# Patient Record
Sex: MALE | Race: WHITE | NOT HISPANIC OR LATINO | URBAN - METROPOLITAN AREA
[De-identification: names, ages, dates, MRNs, and addresses within clinical notes are randomized per-mention and may not be internally consistent; named-entity substitution may affect disease eponyms.]

---

## 2021-06-13 ENCOUNTER — EMERGENCY (EMERGENCY)
Facility: HOSPITAL | Age: 31
LOS: 1 days | Discharge: HOME | End: 2021-06-13
Admitting: EMERGENCY MEDICINE
Payer: OTHER MISCELLANEOUS

## 2021-06-13 VITALS
TEMPERATURE: 98 F | HEART RATE: 99 BPM | RESPIRATION RATE: 18 BRPM | SYSTOLIC BLOOD PRESSURE: 111 MMHG | OXYGEN SATURATION: 100 % | DIASTOLIC BLOOD PRESSURE: 74 MMHG
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
## 2021-06-13 DIAGNOSIS — S05.01XA INJURY OF CONJUNCTIVA AND CORNEAL ABRASION WITHOUT FOREIGN BODY, RIGHT EYE, INITIAL ENCOUNTER: ICD-10-CM

## 2021-06-13 PROCEDURE — 99283 EMERGENCY DEPT VISIT LOW MDM: CPT | Mod: 25

## 2021-06-13 PROCEDURE — 65205 REMOVE FOREIGN BODY FROM EYE: CPT

## 2021-06-13 PROCEDURE — 99053 MED SERV 10PM-8AM 24 HR FAC: CPT

## 2021-06-13 NOTE — ED ADULT TRIAGE NOTE - OTHER COMPLAINTS
CC of eye problem R, feels that there is a particle, "feels weird" as per him but mentioned that a glass broke while he is in the bar. admitted to alcohol but non violent

## 2021-06-14 PROCEDURE — 65220 REMOVE FOREIGN BODY FROM EYE: CPT | Mod: RT

## 2021-06-14 PROCEDURE — 99283 EMERGENCY DEPT VISIT LOW MDM: CPT | Mod: 25

## 2021-06-14 RX ORDER — OFLOXACIN 0.3 %
1 DROPS OPHTHALMIC (EYE) ONCE
Refills: 0 | Status: COMPLETED | OUTPATIENT
Start: 2021-06-14 | End: 2021-06-14

## 2021-06-14 RX ORDER — FLUORESCEIN SODIUM 9 MG
1 STRIP OPHTHALMIC (EYE) ONCE
Refills: 0 | Status: COMPLETED | OUTPATIENT
Start: 2021-06-14 | End: 2021-06-14

## 2021-06-14 RX ADMIN — Medication 1 DROP(S): at 01:02

## 2021-06-14 RX ADMIN — Medication 1 DROP(S): at 00:39

## 2021-06-14 RX ADMIN — Medication 1 APPLICATION(S): at 00:39

## 2021-06-14 NOTE — ED PROVIDER NOTE - OBJECTIVE STATEMENT
30 healthy M p/w R eye pain and FB sensation after injury. Pt reports a glass broke while he was at bar and thinks maybe a piece went into his eye.  + tearing, blurred vision, eye pain and redness.  Denies contact lenses, double vision, flashes light, floaters, headache, dizziness, fainting, use of AC.  tetanus UTD

## 2021-06-14 NOTE — ED ADULT NURSE NOTE - CHPI ED NUR SYMPTOMS NEG
no discharge/no double vision/no drainage/no eye lid swelling/no foreign body/no itching/no pain/no purulent drainage

## 2021-06-14 NOTE — ED PROVIDER NOTE - CLINICAL SUMMARY MEDICAL DECISION MAKING FREE TEXT BOX
30 healthy M p/w R eye pain and FB sensation after injury.   on exam HEENT: ncat, eomi, perrla, injected R sclera w/ + tearing, + pinpoint FB noted under upper inner R eyelid, + uptake fluorscene R corneal 9oclock position, ernie sign neg, no FB noted over cornea, VA 20/20 R, 20/13 L, mmm  FB removed w/ Qtip and relief of FB sensation.  exam consistent with corneal abrasion, will dc with abx drops and outpt ophtho referral.  discussed strict return parameters

## 2021-06-14 NOTE — ED PROVIDER NOTE - PATIENT PORTAL LINK FT
You can access the FollowMyHealth Patient Portal offered by [facility] by registering at the following website: [email or web address]. By joining [facility]’s FollowMyHealth portal, you will also be able to view your health information using other applications (apps) compatible with our system.

## 2021-06-14 NOTE — ED ADULT NURSE NOTE - OBJECTIVE STATEMENT
pt is 30y male, here for a RT eye irritation, pt was doing dishes and was splashed with water, since then reports inability to keep RT open, denies any pain, RT eye noted red but no swelling or discharge, NAD present

## 2021-06-14 NOTE — ED PROVIDER NOTE - NSFOLLOWUPINSTRUCTIONS_ED_ALL_ED_FT
Instill 2 drops in right eye every 6 hours.  Call to make appointment with eye doctor within 2-3 days      Corneal Abrasion     WHAT YOU NEED TO KNOW:    A corneal abrasion is a scratch on the cornea of your eye. The cornea is the clear layer that covers the front of your eye. A small scratch may heal in 1 to 2 days. Deeper or larger scratches may take longer to heal.     Eye Anatomy         DISCHARGE INSTRUCTIONS:    Call your healthcare provider or ophthalmologist if:   •Your eye pain or vision gets worse.       •You have yellow or green drainage from your eye.       •You have questions or concerns about your condition or care.       Medicines:   •Medicines may be given in the form of eyedrops or ointment to help prevent an eye infection. You may also be given eyedrops to decrease pain.      •Take your medicine as directed. Contact your healthcare provider if you think your medicine is not helping or if you have side effects. Tell him or her if you are allergic to any medicine. Keep a list of the medicines, vitamins, and herbs you take. Include the amounts, and when and why you take them. Bring the list or the pill bottles to follow-up visits. Carry your medicine list with you in case of an emergency.      Care for your eyes:   •Get regular eye exams. Get your eyes checked at least every year.      •Eat healthy foods. Fresh fruits and vegetables that are rich in vitamins A and C may help with your vision. Foods such as sweet potatoes, apricots, and carrots are rich in good nutrients for the eyes.      •Take care of your contacts or glasses. Store, clean, and use your contacts or glasses as directed. Replace your glasses or contact lenses as often as your healthcare provider suggests.      •Decrease eye strain. Rest your eyes, especially after you read or sew for long periods of time. Get plenty of sleep at night. Use lights that reduce glare in your home, school, or workplace.      •Wear dark sunglasses. This will help prevent pain and light sensitivity. Make sure the sunglasses have UVA and UVB protection. This will protect your eyes when you go outside.      •Use eyedrops safely. If your treatment plan includes eyedrops, it is important to use them as directed. Your provider may give you detailed instructions to follow. The eyedrops may also come with safety instructions. Follow all instructions to help prevent an infection. Do not touch the tip of the bottle to your eye. Germs from your eye can spread to the medicine bottle.  Steps 1 2 3 4           Help prevent corneal abrasions:   •Remove your contact lenses if your eyes feel dry or irritated.      •Wash your hands if you need to touch your eyes or your face.      •Trim your child's fingernails so he or she cannot scratch his or her eye.      •Wear protective eyewear when you work with chemicals, wood, dust, or metal.      •Wear protective eyewear when you play sports.      •Do not wear your contacts for longer than you should.      •Do not wear colored lenses or lenses with shapes on them. These lenses may cause eye damage and vision loss.      •Do not wear glitter makeup. Glitter can easily get into your eyes and under contact lenses.      •Do not sleep with your contacts in.      Follow up with your healthcare provider as directed: Write down your questions so you remember to ask them during your visits.

## 2021-06-14 NOTE — ED PROVIDER NOTE - NSFOLLOWUPCLINICS_GEN_ALL_ED_FT
[facility] - Ophthalmology Clinic  Ophthalmology  [address], [address]st [place] [address]  Phone: [phone]  Fax:     [facility]  Ophthalmology  [address] [place] [id number]  Phone: [phone]  Fax:

## 2021-06-14 NOTE — ED PROVIDER NOTE - PHYSICAL EXAMINATION
Vitals reviewed  Gen: well appearing, nad, speaking in full sentences  Skin: wwp,  HEENT: ncat, eomi, perrla, injected R sclera w/ + tearing, + pinpoint FB noted under upper inner R eyelid, + uptake fluorscene R corneal 9oclock position, ernie sign neg, no FB noted over cornea, VA 20/20 R, 20/13 L, mmm  CV: rrr, no audible m/r/g  Resp: unlabored breathing  Ext: FROM throughout, no peripheral edema  Neuro: alert/oriented, no focal deficits, steady gait

## 2022-06-04 ENCOUNTER — INPATIENT (INPATIENT)
Facility: HOSPITAL | Age: 32
LOS: 1 days | Discharge: HOME | DRG: 370 | End: 2022-06-06
Attending: STUDENT IN AN ORGANIZED HEALTH CARE EDUCATION/TRAINING PROGRAM | Admitting: STUDENT IN AN ORGANIZED HEALTH CARE EDUCATION/TRAINING PROGRAM
Payer: COMMERCIAL

## 2022-06-04 VITALS
TEMPERATURE: 98 F | DIASTOLIC BLOOD PRESSURE: 83 MMHG | SYSTOLIC BLOOD PRESSURE: 133 MMHG | OXYGEN SATURATION: 96 % | HEIGHT: 67 IN | WEIGHT: 190.04 LBS | RESPIRATION RATE: 16 BRPM | HEART RATE: 89 BPM

## 2022-06-04 DIAGNOSIS — Z29.9 ENCOUNTER FOR PROPHYLACTIC MEASURES, UNSPECIFIED: ICD-10-CM

## 2022-06-04 DIAGNOSIS — F10.10 ALCOHOL ABUSE, UNCOMPLICATED: ICD-10-CM

## 2022-06-04 DIAGNOSIS — K22.6 GASTRO-ESOPHAGEAL LACERATION-HEMORRHAGE SYNDROME: ICD-10-CM

## 2022-06-04 DIAGNOSIS — K92.0 HEMATEMESIS: ICD-10-CM

## 2022-06-04 LAB
ALBUMIN SERPL ELPH-MCNC: 4.3 G/DL — SIGNIFICANT CHANGE UP (ref 3.3–5)
ALBUMIN SERPL ELPH-MCNC: 4.8 G/DL — SIGNIFICANT CHANGE UP (ref 3.3–5)
ALP SERPL-CCNC: 71 U/L — SIGNIFICANT CHANGE UP (ref 40–120)
ALP SERPL-CCNC: 79 U/L — SIGNIFICANT CHANGE UP (ref 40–120)
ALT FLD-CCNC: 144 U/L — HIGH (ref 10–45)
ALT FLD-CCNC: 177 U/L — HIGH (ref 10–45)
ANION GAP SERPL CALC-SCNC: 16 MMOL/L — SIGNIFICANT CHANGE UP (ref 5–17)
APPEARANCE UR: CLEAR — SIGNIFICANT CHANGE UP
APTT BLD: 33.5 SEC — SIGNIFICANT CHANGE UP (ref 27.5–35.5)
AST SERPL-CCNC: 147 U/L — HIGH (ref 10–40)
AST SERPL-CCNC: 184 U/L — HIGH (ref 10–40)
BACTERIA # UR AUTO: PRESENT /HPF
BASE EXCESS BLDV CALC-SCNC: 3.5 MMOL/L — HIGH (ref -2–3)
BASOPHILS # BLD AUTO: 0.06 K/UL — SIGNIFICANT CHANGE UP (ref 0–0.2)
BASOPHILS NFR BLD AUTO: 1.3 % — SIGNIFICANT CHANGE UP (ref 0–2)
BILIRUB DIRECT SERPL-MCNC: 0.4 MG/DL — HIGH (ref 0–0.3)
BILIRUB INDIRECT FLD-MCNC: 0.8 MG/DL — SIGNIFICANT CHANGE UP (ref 0.2–1)
BILIRUB SERPL-MCNC: 1 MG/DL — SIGNIFICANT CHANGE UP (ref 0.2–1.2)
BILIRUB SERPL-MCNC: 1.2 MG/DL — SIGNIFICANT CHANGE UP (ref 0.2–1.2)
BILIRUB UR-MCNC: ABNORMAL
BLD GP AB SCN SERPL QL: NEGATIVE — SIGNIFICANT CHANGE UP
BUN SERPL-MCNC: 7 MG/DL — SIGNIFICANT CHANGE UP (ref 7–23)
CALCIUM SERPL-MCNC: 9.1 MG/DL — SIGNIFICANT CHANGE UP (ref 8.4–10.5)
CHLORIDE SERPL-SCNC: 101 MMOL/L — SIGNIFICANT CHANGE UP (ref 96–108)
CO2 BLDV-SCNC: 30.5 MMOL/L — HIGH (ref 22–26)
CO2 SERPL-SCNC: 25 MMOL/L — SIGNIFICANT CHANGE UP (ref 22–31)
COLOR SPEC: YELLOW — SIGNIFICANT CHANGE UP
COMMENT - URINE: SIGNIFICANT CHANGE UP
CREAT SERPL-MCNC: 0.78 MG/DL — SIGNIFICANT CHANGE UP (ref 0.5–1.3)
DIFF PNL FLD: NEGATIVE — SIGNIFICANT CHANGE UP
EGFR: 121 ML/MIN/1.73M2 — SIGNIFICANT CHANGE UP
EOSINOPHIL # BLD AUTO: 0.07 K/UL — SIGNIFICANT CHANGE UP (ref 0–0.5)
EOSINOPHIL NFR BLD AUTO: 1.5 % — SIGNIFICANT CHANGE UP (ref 0–6)
EPI CELLS # UR: SIGNIFICANT CHANGE UP /HPF (ref 0–5)
ETHANOL SERPL-MCNC: 36 MG/DL — HIGH (ref 0–10)
GLUCOSE SERPL-MCNC: 101 MG/DL — HIGH (ref 70–99)
GLUCOSE UR QL: 100
HCO3 BLDV-SCNC: 29 MMOL/L — SIGNIFICANT CHANGE UP (ref 22–29)
HCT VFR BLD CALC: 41.2 % — SIGNIFICANT CHANGE UP (ref 39–50)
HGB BLD-MCNC: 14.3 G/DL — SIGNIFICANT CHANGE UP (ref 13–17)
IMM GRANULOCYTES NFR BLD AUTO: 0.2 % — SIGNIFICANT CHANGE UP (ref 0–1.5)
INR BLD: 1.08 — SIGNIFICANT CHANGE UP (ref 0.88–1.16)
KETONES UR-MCNC: 15 MG/DL
LEUKOCYTE ESTERASE UR-ACNC: NEGATIVE — SIGNIFICANT CHANGE UP
LYMPHOCYTES # BLD AUTO: 1.04 K/UL — SIGNIFICANT CHANGE UP (ref 1–3.3)
LYMPHOCYTES # BLD AUTO: 22.2 % — SIGNIFICANT CHANGE UP (ref 13–44)
MCHC RBC-ENTMCNC: 32.1 PG — SIGNIFICANT CHANGE UP (ref 27–34)
MCHC RBC-ENTMCNC: 34.7 GM/DL — SIGNIFICANT CHANGE UP (ref 32–36)
MCV RBC AUTO: 92.4 FL — SIGNIFICANT CHANGE UP (ref 80–100)
MONOCYTES # BLD AUTO: 0.44 K/UL — SIGNIFICANT CHANGE UP (ref 0–0.9)
MONOCYTES NFR BLD AUTO: 9.4 % — SIGNIFICANT CHANGE UP (ref 2–14)
NEUTROPHILS # BLD AUTO: 3.07 K/UL — SIGNIFICANT CHANGE UP (ref 1.8–7.4)
NEUTROPHILS NFR BLD AUTO: 65.4 % — SIGNIFICANT CHANGE UP (ref 43–77)
NITRITE UR-MCNC: NEGATIVE — SIGNIFICANT CHANGE UP
NRBC # BLD: 0 /100 WBCS — SIGNIFICANT CHANGE UP (ref 0–0)
PCO2 BLDV: 47 MMHG — SIGNIFICANT CHANGE UP (ref 42–55)
PCP SPEC-MCNC: SIGNIFICANT CHANGE UP
PH BLDV: 7.4 — SIGNIFICANT CHANGE UP (ref 7.32–7.43)
PH UR: 8 — SIGNIFICANT CHANGE UP (ref 5–8)
PLATELET # BLD AUTO: 190 K/UL — SIGNIFICANT CHANGE UP (ref 150–400)
PO2 BLDV: 60 MMHG — HIGH (ref 25–45)
POTASSIUM SERPL-MCNC: 3.9 MMOL/L — SIGNIFICANT CHANGE UP (ref 3.5–5.3)
POTASSIUM SERPL-SCNC: 3.9 MMOL/L — SIGNIFICANT CHANGE UP (ref 3.5–5.3)
PROT SERPL-MCNC: 7.3 G/DL — SIGNIFICANT CHANGE UP (ref 6–8.3)
PROT SERPL-MCNC: 7.9 G/DL — SIGNIFICANT CHANGE UP (ref 6–8.3)
PROT UR-MCNC: 30 MG/DL
PROTHROM AB SERPL-ACNC: 12.9 SEC — SIGNIFICANT CHANGE UP (ref 10.5–13.4)
RBC # BLD: 4.46 M/UL — SIGNIFICANT CHANGE UP (ref 4.2–5.8)
RBC # FLD: 11.9 % — SIGNIFICANT CHANGE UP (ref 10.3–14.5)
RBC CASTS # UR COMP ASSIST: < 5 /HPF — SIGNIFICANT CHANGE UP
RH IG SCN BLD-IMP: POSITIVE — SIGNIFICANT CHANGE UP
SAO2 % BLDV: 88.6 % — HIGH (ref 67–88)
SARS-COV-2 RNA SPEC QL NAA+PROBE: NEGATIVE — SIGNIFICANT CHANGE UP
SODIUM SERPL-SCNC: 142 MMOL/L — SIGNIFICANT CHANGE UP (ref 135–145)
SP GR SPEC: 1.01 — SIGNIFICANT CHANGE UP (ref 1–1.03)
UROBILINOGEN FLD QL: 4 E.U./DL
WBC # BLD: 4.69 K/UL — SIGNIFICANT CHANGE UP (ref 3.8–10.5)
WBC # FLD AUTO: 4.69 K/UL — SIGNIFICANT CHANGE UP (ref 3.8–10.5)
WBC UR QL: < 5 /HPF — SIGNIFICANT CHANGE UP

## 2022-06-04 PROCEDURE — 99223 1ST HOSP IP/OBS HIGH 75: CPT

## 2022-06-04 PROCEDURE — 99285 EMERGENCY DEPT VISIT HI MDM: CPT

## 2022-06-04 PROCEDURE — 93010 ELECTROCARDIOGRAM REPORT: CPT

## 2022-06-04 PROCEDURE — 71045 X-RAY EXAM CHEST 1 VIEW: CPT | Mod: 26

## 2022-06-04 RX ORDER — PANTOPRAZOLE SODIUM 20 MG/1
80 TABLET, DELAYED RELEASE ORAL ONCE
Refills: 0 | Status: COMPLETED | OUTPATIENT
Start: 2022-06-04 | End: 2022-06-04

## 2022-06-04 RX ORDER — SODIUM CHLORIDE 9 MG/ML
1000 INJECTION INTRAMUSCULAR; INTRAVENOUS; SUBCUTANEOUS ONCE
Refills: 0 | Status: COMPLETED | OUTPATIENT
Start: 2022-06-04 | End: 2022-06-04

## 2022-06-04 RX ORDER — ONDANSETRON 8 MG/1
4 TABLET, FILM COATED ORAL EVERY 6 HOURS
Refills: 0 | Status: DISCONTINUED | OUTPATIENT
Start: 2022-06-04 | End: 2022-06-05

## 2022-06-04 RX ORDER — ONDANSETRON 8 MG/1
4 TABLET, FILM COATED ORAL ONCE
Refills: 0 | Status: COMPLETED | OUTPATIENT
Start: 2022-06-04 | End: 2022-06-04

## 2022-06-04 RX ORDER — PANTOPRAZOLE SODIUM 20 MG/1
8 TABLET, DELAYED RELEASE ORAL
Qty: 80 | Refills: 0 | Status: DISCONTINUED | OUTPATIENT
Start: 2022-06-04 | End: 2022-06-05

## 2022-06-04 RX ADMIN — PANTOPRAZOLE SODIUM 80 MILLIGRAM(S): 20 TABLET, DELAYED RELEASE ORAL at 17:09

## 2022-06-04 RX ADMIN — ONDANSETRON 4 MILLIGRAM(S): 8 TABLET, FILM COATED ORAL at 18:14

## 2022-06-04 RX ADMIN — SODIUM CHLORIDE 1000 MILLILITER(S): 9 INJECTION INTRAMUSCULAR; INTRAVENOUS; SUBCUTANEOUS at 17:10

## 2022-06-04 RX ADMIN — ONDANSETRON 4 MILLIGRAM(S): 8 TABLET, FILM COATED ORAL at 17:09

## 2022-06-04 RX ADMIN — PANTOPRAZOLE SODIUM 10 MG/HR: 20 TABLET, DELAYED RELEASE ORAL at 17:35

## 2022-06-04 RX ADMIN — ONDANSETRON 4 MILLIGRAM(S): 8 TABLET, FILM COATED ORAL at 21:48

## 2022-06-04 RX ADMIN — Medication 1 MILLIGRAM(S): at 17:09

## 2022-06-04 NOTE — H&P ADULT - HISTORY OF PRESENT ILLNESS
Patient is a 31 year old male with no significant past medical history presenting with 1 day history of hematemesis     ED course:  Vitals: 97.7F, HR 88, /63 (orthostatics negative)  Labs: WBC 4, Hb 14.3/Hct 41.2, plt 190, INR 1.08, BUN 7/Cr 0.78, /, Alcohol 36  Imaging: CXR WNL  Interventions: ativan x1, zofran x2, PPI 80 --> PPI gtt, 1L NS x1, GI consulted Patient is a 31 year old male with no significant past medical history presenting with 1 day history of hematemesis. Pt reports drinking 1/2 bottle of whiskey last night and then had 6 episodes of blood emesis today associated with epigastric tenderness. Drinks 5x/week and varies between 4 beers/day or 2-3 shots of whiskey. No history of alcohol withdrawal or seizures. No fever/chills/SOB/chest pain/dizziness.     ED course:  Vitals: 97.7F, HR 88, /63 (orthostatics negative)  Labs: WBC 4, Hb 14.3/Hct 41.2, plt 190, INR 1.08, BUN 7/Cr 0.78, /, Alcohol 36  Imaging: CXR WNL  Interventions: ativan x1, zofran x2, PPI 80 --> PPI gtt, 1L NS x1, GI consulted Patient is a 31 year old male with no significant past medical history presenting with 1 day history of hematemesis. Pt reports drinking 1/2 bottle of whiskey last night and then had 6 episodes of blood emesis (estimated to be around 8oz each) today associated with epigastric tenderness. Initially emesis bilious until 3AM this morning when patient notes dark coloration to vomit. Drinks daily 2-3L daily (beer, rum, champagne). 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 Patient notes to have experienced darker stools than usual (2 weeks in duration) without diarrhea or change in caliber. Notably patient denies ever experiencing alcohol withdrawal, has never experienced DTs, has not needed to be intubated. Denies tobacco use, used cocaine once a month ago, last IVUD used 1 month ago. Patient father with bowel + lung + throat CA, etiology of initial cancer unclear.     ED course:  Vitals: 97.7F, HR 88, /63 (orthostatics negative)  Labs: WBC 4, Hb 14.3/Hct 41.2, plt 190, INR 1.08, BUN 7/Cr 0.78, /, Alcohol 36  Imaging: CXR WNL  Interventions: ativan x1, zofran x2, PPI 80 --> PPI gtt, 1L NS x1, GI consulted

## 2022-06-04 NOTE — H&P ADULT - PROBLEM SELECTOR PLAN 2
RULE OUT: p/w 6 episodes coffee ground emesis possibly 2/2 mallorry hernández  - see management above RULE OUT: p/w 6 episodes coffee ground emesis possibly 2/2 mallorry hernández  - see management above  - zofran 4mg IVP q6h

## 2022-06-04 NOTE — ED ADULT NURSE NOTE - OBJECTIVE STATEMENT
Pt complains of "vomiting blood" since 1 hour ago. pt also reports constant epigastric/burning pain since multiple days ago. Non-radiating. Pain worst when eating. Pt reports ETOH use, half a bottle of whiskey X2 weekly.  last use last night. Pt denies blood in the stool.

## 2022-06-04 NOTE — H&P ADULT - NSHPPHYSICALEXAM_GEN_ALL_CORE
T(C): 36.5 (06-04-22 @ 17:56), Max: 36.8 (06-04-22 @ 16:21)  HR: 88 (06-04-22 @ 17:35) (88 - 89)  BP: 131/63 (06-04-22 @ 17:35) (131/63 - 133/83)  RR: 18 (06-04-22 @ 17:56) (16 - 18)  SpO2: 96% (06-04-22 @ 17:56) (96% - 97%)    General: NAD, laying in bed, speaking in full sentences  HEENT: head NC/AT, no conjunctival injection, EOMI, MMM  Neck: supple, no JVD  Cardio: RRR, +S1/S2, no M/R/G  Resp: lungs CTAB, no cough/wheezes/rales/rhonchi  Abdo: soft, NT, ND, +bowel sounds x4  Extremities: WWP, no edema/cyanosis/clubbing   Vasc: 2+ radial and DP pulses b/l  Neuro: A&Ox3  Psych: speech non-pressured, thoughts goal-oriented  Skin: dry, intact, no visible jaundice T(C): 36.5 (06-04-22 @ 17:56), Max: 36.8 (06-04-22 @ 16:21)  HR: 88 (06-04-22 @ 17:35) (88 - 89)  BP: 131/63 (06-04-22 @ 17:35) (131/63 - 133/83)  RR: 18 (06-04-22 @ 17:56) (16 - 18)  SpO2: 96% (06-04-22 @ 17:56) (96% - 97%)    General: NAD, laying in bed, speaking in full sentences  HEENT: head NC/AT, no conjunctival injection, EOMI, MMM  Neck: supple, no JVD  Cardio: RRR, +S1/S2, no M/R/G  Resp: lungs CTAB, no cough/wheezes/rales/rhonchi  Abdo: soft, epigatric tenderness, ND, +bowel sounds x4  Extremities: WWP, no edema/cyanosis/clubbing   Vasc: 2+ radial and DP pulses b/l  Neuro: A&Ox3  Psych: speech non-pressured, thoughts goal-oriented  Skin: dry, intact, no visible jaundice

## 2022-06-04 NOTE — H&P ADULT - NSHPLABSRESULTS_GEN_ALL_CORE
.  LABS:                         14.3   4.69  )-----------( 190      ( 04 Jun 2022 17:02 )             41.2     06-04    142  |  101  |  7   ----------------------------<  101<H>  3.9   |  25  |  0.78    Ca    9.1      04 Jun 2022 17:02    TPro  7.9  /  Alb  4.8  /  TBili  1.0  /  DBili  x   /  AST  184<H>  /  ALT  177<H>  /  AlkPhos  79  06-04    PT/INR - ( 04 Jun 2022 17:02 )   PT: 12.9 sec;   INR: 1.08          PTT - ( 04 Jun 2022 17:02 )  PTT:33.5 sec              RADIOLOGY, EKG & ADDITIONAL TESTS: Reviewed.

## 2022-06-04 NOTE — ED ADULT NURSE NOTE - CHIEF COMPLAINT QUOTE
vomiting blood.   pt states vomited bright red blood x 5-6 times today.  States has had epigastric pain x 3 days.   States drinks large quantities of alcohol daily.

## 2022-06-04 NOTE — H&P ADULT - PROBLEM SELECTOR PLAN 1
p/w 1 day hx of hematemesis after binge drinking. Reports 6 episodes of whats described as coffee ground emesis.   - GI consulted, recs appreciated  - c/w PPI gtt  - NPO  - active T&S, transfuse Hb <7 p/w 1 day hx of hematemesis after binge drinking. Reports 6 episodes of whats described as coffee ground emesis.   - GI consulted, recs appreciated  - c/w PPI gtt  - 2 large bore perpheral IVs  - NPO  - active T&S, transfuse Hb <7

## 2022-06-04 NOTE — ED ADULT TRIAGE NOTE - CHIEF COMPLAINT QUOTE
vomiting blood.   pt states vomited bright red blood x 4-5 times today.  States has had epigastric pain x 3 days.   States drinks large quantities of alcohol daily. vomiting blood.   pt states vomited bright red blood x 5-6 times today.  States has had epigastric pain x 3 days.   States drinks large quantities of alcohol daily.

## 2022-06-04 NOTE — ED PROVIDER NOTE - CLINICAL SUMMARY MEDICAL DECISION MAKING FREE TEXT BOX
n/v - coffee ground emesis- suspect GI bleeding related to excess etoh- iv ppi bolus/gtt- manage w/d sx, gi consult n/v - coffee ground emesis- suspect GI bleeding related to excess etoh- iv ppi bolus/gtt- manage w/d sx, gi consult  d/w GI agree w probable avel hernández- rec control n/v/ w/d sx- ppi npo- they will follow

## 2022-06-04 NOTE — H&P ADULT - PROBLEM SELECTOR PLAN 3
Reports drinking multiple times/week. Reports drinking 5 times/week, either 4 beers or 2-3 shots whiskey. no hx of alcohol withdrawal or seizures. previous Iv drug use, last use 1 month ago.  - f/u U tox  - CIWA currently 0   - CIWA protocol

## 2022-06-04 NOTE — H&P ADULT - NSHPREVIEWOFSYSTEMS_GEN_ALL_CORE
REVIEW OF SYSTEMS:    CONSTITUTIONAL: No weakness, fevers or chills  EYES/ENT: No visual changes;  No throat pain   NECK: No pain or stiffness  RESPIRATORY: No cough, wheezing, hemoptysis; No shortness of breath  CARDIOVASCULAR: No chest pain or palpitations  GASTROINTESTINAL: No abdominal. No diarrhea or constipation. No melena. (+) vomiting, hematemesis   GENITOURINARY: No dysuria, frequency or hematuria  NEUROLOGICAL: No numbness or weakness  SKIN: No itching, rashes

## 2022-06-04 NOTE — H&P ADULT - PROBLEM SELECTOR PLAN 4
F: s/p 1L NS  E: replete to K>4, Mg>2, Phos>2.5  N: NPO   Ppx: PPI gtt    Code Status: full code    Dispo: [facility]

## 2022-06-04 NOTE — H&P ADULT - ASSESSMENT
Patient is a 31 year old male with no significant past medical history presenting with 1 day history of hematemesis admitted for likely avel hernández  Patient is a 31 year old male with no significant past medical history presenting with 1 day history of hematemesis admitted for likely avel hernández tear, known to GI. No prior hx of alcohol withdrawal or intubation.

## 2022-06-04 NOTE — ED ADULT NURSE NOTE - NSIMPLEMENTINTERV_GEN_ALL_ED
Implemented All Universal Safety Interventions:  [place] to call system. Call bell, personal items and telephone within reach. Instruct patient to call for assistance. Room bathroom lighting operational. Non-slip footwear when patient is off stretcher. Physically safe environment: no spills, clutter or unnecessary equipment. Stretcher in lowest position, wheels locked, appropriate side rails in place.

## 2022-06-04 NOTE — H&P ADULT - NSHPSOCIALHISTORY_GEN_ALL_CORE
Patient reports to drink ________, smokes ________ packs per day, Patient drinks 5/x week either ~4 beers or 2-3 shots of whiskey, non smoker, previous IVUD last used 1 month ago

## 2022-06-05 LAB
ALBUMIN SERPL ELPH-MCNC: 4.2 G/DL — SIGNIFICANT CHANGE UP (ref 3.3–5)
ALP SERPL-CCNC: 69 U/L — SIGNIFICANT CHANGE UP (ref 40–120)
ALT FLD-CCNC: 134 U/L — HIGH (ref 10–45)
AMPHET UR-MCNC: NEGATIVE — SIGNIFICANT CHANGE UP
ANION GAP SERPL CALC-SCNC: 15 MMOL/L — SIGNIFICANT CHANGE UP (ref 5–17)
APTT BLD: 30.6 SEC — SIGNIFICANT CHANGE UP (ref 27.5–35.5)
AST SERPL-CCNC: 122 U/L — HIGH (ref 10–40)
BARBITURATES UR SCN-MCNC: NEGATIVE — SIGNIFICANT CHANGE UP
BASOPHILS # BLD AUTO: 0.05 K/UL — SIGNIFICANT CHANGE UP (ref 0–0.2)
BASOPHILS NFR BLD AUTO: 1.1 % — SIGNIFICANT CHANGE UP (ref 0–2)
BENZODIAZ UR-MCNC: NEGATIVE — SIGNIFICANT CHANGE UP
BILIRUB SERPL-MCNC: 1.4 MG/DL — HIGH (ref 0.2–1.2)
BLD GP AB SCN SERPL QL: NEGATIVE — SIGNIFICANT CHANGE UP
BUN SERPL-MCNC: 12 MG/DL — SIGNIFICANT CHANGE UP (ref 7–23)
CALCIUM SERPL-MCNC: 8.7 MG/DL — SIGNIFICANT CHANGE UP (ref 8.4–10.5)
CHLORIDE SERPL-SCNC: 102 MMOL/L — SIGNIFICANT CHANGE UP (ref 96–108)
CO2 SERPL-SCNC: 23 MMOL/L — SIGNIFICANT CHANGE UP (ref 22–31)
COCAINE METAB.OTHER UR-MCNC: POSITIVE
CREAT SERPL-MCNC: 0.85 MG/DL — SIGNIFICANT CHANGE UP (ref 0.5–1.3)
EGFR: 118 ML/MIN/1.73M2 — SIGNIFICANT CHANGE UP
EOSINOPHIL # BLD AUTO: 0.16 K/UL — SIGNIFICANT CHANGE UP (ref 0–0.5)
EOSINOPHIL NFR BLD AUTO: 3.5 % — SIGNIFICANT CHANGE UP (ref 0–6)
GLUCOSE SERPL-MCNC: 70 MG/DL — SIGNIFICANT CHANGE UP (ref 70–99)
HCT VFR BLD CALC: 39.7 % — SIGNIFICANT CHANGE UP (ref 39–50)
HGB BLD-MCNC: 13.3 G/DL — SIGNIFICANT CHANGE UP (ref 13–17)
IMM GRANULOCYTES NFR BLD AUTO: 0.4 % — SIGNIFICANT CHANGE UP (ref 0–1.5)
LYMPHOCYTES # BLD AUTO: 1.2 K/UL — SIGNIFICANT CHANGE UP (ref 1–3.3)
LYMPHOCYTES # BLD AUTO: 26.5 % — SIGNIFICANT CHANGE UP (ref 13–44)
MAGNESIUM SERPL-MCNC: 2.3 MG/DL — SIGNIFICANT CHANGE UP (ref 1.6–2.6)
MCHC RBC-ENTMCNC: 31.7 PG — SIGNIFICANT CHANGE UP (ref 27–34)
MCHC RBC-ENTMCNC: 33.5 GM/DL — SIGNIFICANT CHANGE UP (ref 32–36)
MCV RBC AUTO: 94.7 FL — SIGNIFICANT CHANGE UP (ref 80–100)
METHADONE UR-MCNC: NEGATIVE — SIGNIFICANT CHANGE UP
MONOCYTES # BLD AUTO: 0.51 K/UL — SIGNIFICANT CHANGE UP (ref 0–0.9)
MONOCYTES NFR BLD AUTO: 11.3 % — SIGNIFICANT CHANGE UP (ref 2–14)
NEUTROPHILS # BLD AUTO: 2.58 K/UL — SIGNIFICANT CHANGE UP (ref 1.8–7.4)
NEUTROPHILS NFR BLD AUTO: 57.2 % — SIGNIFICANT CHANGE UP (ref 43–77)
NRBC # BLD: 0 /100 WBCS — SIGNIFICANT CHANGE UP (ref 0–0)
OPIATES UR-MCNC: NEGATIVE — SIGNIFICANT CHANGE UP
PCP SPEC-MCNC: SIGNIFICANT CHANGE UP
PCP UR-MCNC: NEGATIVE — SIGNIFICANT CHANGE UP
PHOSPHATE SERPL-MCNC: 3.5 MG/DL — SIGNIFICANT CHANGE UP (ref 2.5–4.5)
PLATELET # BLD AUTO: 151 K/UL — SIGNIFICANT CHANGE UP (ref 150–400)
POTASSIUM SERPL-MCNC: 4 MMOL/L — SIGNIFICANT CHANGE UP (ref 3.5–5.3)
POTASSIUM SERPL-SCNC: 4 MMOL/L — SIGNIFICANT CHANGE UP (ref 3.5–5.3)
PROT SERPL-MCNC: 7 G/DL — SIGNIFICANT CHANGE UP (ref 6–8.3)
RBC # BLD: 4.19 M/UL — LOW (ref 4.2–5.8)
RBC # FLD: 11.8 % — SIGNIFICANT CHANGE UP (ref 10.3–14.5)
RH IG SCN BLD-IMP: POSITIVE — SIGNIFICANT CHANGE UP
SODIUM SERPL-SCNC: 140 MMOL/L — SIGNIFICANT CHANGE UP (ref 135–145)
THC UR QL: NEGATIVE — SIGNIFICANT CHANGE UP
WBC # BLD: 4.52 K/UL — SIGNIFICANT CHANGE UP (ref 3.8–10.5)
WBC # FLD AUTO: 4.52 K/UL — SIGNIFICANT CHANGE UP (ref 3.8–10.5)

## 2022-06-05 PROCEDURE — 99223 1ST HOSP IP/OBS HIGH 75: CPT

## 2022-06-05 PROCEDURE — 99233 SBSQ HOSP IP/OBS HIGH 50: CPT | Mod: GC

## 2022-06-05 RX ORDER — PANTOPRAZOLE SODIUM 20 MG/1
40 TABLET, DELAYED RELEASE ORAL EVERY 12 HOURS
Refills: 0 | Status: DISCONTINUED | OUTPATIENT
Start: 2022-06-05 | End: 2022-06-06

## 2022-06-05 RX ORDER — THIAMINE MONONITRATE (VIT B1) 100 MG
100 TABLET ORAL DAILY
Refills: 0 | Status: DISCONTINUED | OUTPATIENT
Start: 2022-06-05 | End: 2022-06-06

## 2022-06-05 RX ORDER — FOLIC ACID 0.8 MG
1 TABLET ORAL DAILY
Refills: 0 | Status: DISCONTINUED | OUTPATIENT
Start: 2022-06-05 | End: 2022-06-06

## 2022-06-05 RX ORDER — ONDANSETRON 8 MG/1
4 TABLET, FILM COATED ORAL EVERY 8 HOURS
Refills: 0 | Status: DISCONTINUED | OUTPATIENT
Start: 2022-06-05 | End: 2022-06-06

## 2022-06-05 RX ADMIN — PANTOPRAZOLE SODIUM 10 MG/HR: 20 TABLET, DELAYED RELEASE ORAL at 11:16

## 2022-06-05 RX ADMIN — Medication 100 MILLIGRAM(S): at 18:05

## 2022-06-05 RX ADMIN — ONDANSETRON 4 MILLIGRAM(S): 8 TABLET, FILM COATED ORAL at 06:04

## 2022-06-05 RX ADMIN — Medication 1 TABLET(S): at 18:04

## 2022-06-05 RX ADMIN — PANTOPRAZOLE SODIUM 10 MG/HR: 20 TABLET, DELAYED RELEASE ORAL at 01:55

## 2022-06-05 RX ADMIN — ONDANSETRON 4 MILLIGRAM(S): 8 TABLET, FILM COATED ORAL at 11:16

## 2022-06-05 RX ADMIN — Medication 1 MILLIGRAM(S): at 18:04

## 2022-06-05 RX ADMIN — PANTOPRAZOLE SODIUM 40 MILLIGRAM(S): 20 TABLET, DELAYED RELEASE ORAL at 18:04

## 2022-06-05 NOTE — PROGRESS NOTE ADULT - PROBLEM SELECTOR PLAN 1
p/w 1 day hx of hematemesis after binge drinking. Reports 6 episodes of whats described as coffee ground emesis.   - GI consulted, recs appreciated  - c/w PPI gtt  - 2 large bore perpheral IVs  - NPO  - active T&S, transfuse Hb <7

## 2022-06-05 NOTE — PROGRESS NOTE ADULT - PROBLEM SELECTOR PLAN 2
RULE OUT: p/w 6 episodes coffee ground emesis possibly 2/2 mallorry hernández  - see management above  - zofran 4mg IVP q6h

## 2022-06-05 NOTE — CONSULT NOTE ADULT - ASSESSMENT
31 year old male PMHx EtOH abuse presenting with 1 day history of hematemesis/coffee ground emesis, GI consulted for consideration of endoscopic evaluation.     #Hematemesis  #Coffee Ground Emesis   Patient presenting with complaints of 1-2 days of nausea/vomiting, initially yellow in color, turned black with blood red streaks, in the setting of heavy etOH consumption, last drink 4-5 am on 6/4. H/H stable with slight drop in Hgb however dry on admission, suspect avel hernández tear. Previous 2016 CT A/P demonstrating hepatic steatosis, no apparent compromise to synthetic function or e/o to suggest portal HTN per lab review (normal albumin, normal PLTs and coags). Variceal bleeding low on ddx  -CLD today  -PPI 40 mg IVP BID   -Closely monitor H/H  -Maintain Active T&S  -Transfusion goal as per primary team  -Anti-emetics as per primary team  -If H/H stable by tomorrow AM, can consider outpatient EGD evaluation    #EtoH abuse   #Transaminitis  With 6 history of EtOH abuse, with more heavy drinking within the last year, reporting 60-80 drinks of beer +/- whiskey per week  - Obtain abdominal US to further evaluate liver morphology in the setting of heavy EtOH consumption  - Obtain daily CMPs   - Counseled on importance of cessation, SW consult for EtOH abuse  - CIWA (high risk protocol)  - MVI, thiamine, FA daily     Case discussed with svc attending and primary team.     Jocelyne Angeles DO  Gastroenterology Fellow  Pager: [phone]

## 2022-06-05 NOTE — PROGRESS NOTE ADULT - PROBLEM SELECTOR PLAN 3
Reports drinking 5 times/week, either 4 beers or 2-3 shots whiskey. no hx of alcohol withdrawal or seizures. previous Iv drug use, last use 1 month ago.  - CIWA currently 0   - CIWA protocol

## 2022-06-05 NOTE — PROGRESS NOTE ADULT - ATTENDING COMMENTS
31 year old male with no significant past medical history presenting with 1 day history of hematemesis admitted for likely avel hernández tear, known to GI. No prior hx of alcohol withdrawal or intubation.     Plan  NPO  pending GI recs  c/w PPI   hold AC; no further hematemesis noted  elevated LFTs, obtaining hepatitis panel, RUQ u/s

## 2022-06-05 NOTE — CONSULT NOTE ADULT - SUBJECTIVE AND OBJECTIVE BOX
HPI:  Patient is a 31 year old male with no significant past medical history presenting with 1 day history of hematemesis. Pt reports drinking 1/2 bottle of whiskey last night and then had 6 episodes of blood emesis (estimated to be around 8oz each) today associated with epigastric tenderness. Initially emesis bilious until 3AM this morning when patient notes dark coloration to vomit. Drinks daily 2-3L daily (beer, rum, champagne). 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 Patient notes to have experienced darker stools than usual (2 weeks in duration) without diarrhea or change in caliber. Notably patient denies ever experiencing alcohol withdrawal, has never experienced DTs, has not needed to be intubated. Denies tobacco use, used cocaine once a month ago, last IVUD used 1 month ago. Patient father with bowel + lung + throat CA, etiology of initial cancer unclear.     ED course:  Vitals: 97.7F, HR 88, /63 (orthostatics negative)  Labs: WBC 4, Hb 14.3/Hct 41.2, plt 190, INR 1.08, BUN 7/Cr 0.78, /, Alcohol 36  Imaging: CXR WNL  Interventions: ativan x1, zofran x2, PPI 80 --> PPI gtt, 1L NS x1, GI consulted (04 Jun 2022 18:39)    GI consulted for coffee ground emesis/hematemesis, suspicious for avel hernández tear and for consideration of endoscopic evaluation    PMHx: EtOH abuse (no history of DTs, withdrawal seizures), ~60-80 drinks of beer or whiskey/week, increased EtOH intake about 1 year ago  PSHx: no prior abdominal surgeries  Allergies: NKDA  Meds: none  SH: EtOH abuse, as noted above  FMHx: father - CRC (age 65)    Allergies    No Known Allergies    Intolerances      MEDICATIONS:  MEDICATIONS  (STANDING):  pantoprazole  Injectable 40 milliGRAM(s) IV Push every 12 hours    MEDICATIONS  (PRN):  LORazepam   Injectable 2 milliGRAM(s) IV Push every 1 hour PRN CIWA-Ar score 8 or greater  ondansetron Injectable 4 milliGRAM(s) IV Push every 8 hours PRN Nausea and/or Vomiting    PAST MEDICAL & SURGICAL HISTORY:  No pertinent past medical history      No significant past surgical history        FAMILY HISTORY:    SOCIAL HISTORY:  [place]: [ ] Current, [ ] Former, [ ] Never; Pack Years:  Alcohol:  Illicit Drugs:    REVIEW OF SYSTEMS:  Constitutional: No fever, chills, weight loss, or fatigue  ENMT:  No visual changes; No difficulty hearing, tinnitus, vertigo; No sinus or throat pain  Neck: No pain or stiffness  Respiratory: No cough, wheezing, or hemoptysis; No shortness of breath  Cardiovascular: No chest pain, palpitations, dizziness, orthopnea, PND, or leg swelling  Gastrointestinal: No abdominal or epigastric pain. No  nausea, vomiting, or hematemesis. No diarrhea, constipation, or steatorrhea. No melena or hematochezia  Genitourinary: No dysuria, urinary frequency/hesitancy, or hematuria  Skin: No itching, burning, rashes or lesions   Musculoskeletal: No joint pain or swelling; No muscle, back or extremity pain    Vital Signs Last 24 Hrs  T(C): 36.5 (05 Jun 2022 04:50), Max: 36.8 (04 Jun 2022 16:21)  T(F): 97.7 (05 Jun 2022 04:50), Max: 98.2 (04 Jun 2022 16:21)  HR: 61 (05 Jun 2022 04:50) (61 - 89)  BP: 119/68 (05 Jun 2022 04:50) (119/68 - 152/72)  BP(mean): --  RR: 18 (05 Jun 2022 04:50) (15 - 18)  SpO2: 98% (05 Jun 2022 04:50) (95% - 98%)      PHYSICAL EXAM:    General: Well developed; well nourished; in no acute distress  HEENT: MMM, conjunctiva and sclera clear; +tongue fasciculations  Gastrointestinal: Soft, non-tender non-distended; Normal bowel sounds; No rebound or guarding  Extremities: Normal range of motion, No clubbing, cyanosis or edema  Neurological: Alert and oriented x3; faint tremor of outstretched hands  Skin: Warm and dry. No obvious rash    LABS:                        13.3   4.52  )-----------( 151      ( 05 Jun 2022 05:30 )             39.7     06-05    140  |  102  |  12  ----------------------------<  70  4.0   |  23  |  0.85    Ca    8.7      05 Jun 2022 05:30  Phos  3.5     06-05  Mg     2.3     06-05    TPro  7.0  /  Alb  4.2  /  TBili  1.4<H>  /  DBili  x   /  AST  122<H>  /  ALT  134<H>  /  AlkPhos  69  06-05        RADIOLOGY & ADDITIONAL STUDIES:

## 2022-06-06 VITALS
OXYGEN SATURATION: 96 % | DIASTOLIC BLOOD PRESSURE: 79 MMHG | SYSTOLIC BLOOD PRESSURE: 136 MMHG | TEMPERATURE: 98 F | HEART RATE: 69 BPM | RESPIRATION RATE: 18 BRPM

## 2022-06-06 LAB
ALBUMIN SERPL ELPH-MCNC: 4.4 G/DL — SIGNIFICANT CHANGE UP (ref 3.3–5)
ALP SERPL-CCNC: 72 U/L — SIGNIFICANT CHANGE UP (ref 40–120)
ALT FLD-CCNC: 113 U/L — HIGH (ref 10–45)
ANION GAP SERPL CALC-SCNC: 11 MMOL/L — SIGNIFICANT CHANGE UP (ref 5–17)
APTT BLD: 29.8 SEC — SIGNIFICANT CHANGE UP (ref 27.5–35.5)
AST SERPL-CCNC: 89 U/L — HIGH (ref 10–40)
BASOPHILS # BLD AUTO: 0.03 K/UL — SIGNIFICANT CHANGE UP (ref 0–0.2)
BASOPHILS NFR BLD AUTO: 0.6 % — SIGNIFICANT CHANGE UP (ref 0–2)
BILIRUB SERPL-MCNC: 1.1 MG/DL — SIGNIFICANT CHANGE UP (ref 0.2–1.2)
BUN SERPL-MCNC: 7 MG/DL — SIGNIFICANT CHANGE UP (ref 7–23)
CALCIUM SERPL-MCNC: 9.3 MG/DL — SIGNIFICANT CHANGE UP (ref 8.4–10.5)
CHLORIDE SERPL-SCNC: 100 MMOL/L — SIGNIFICANT CHANGE UP (ref 96–108)
CO2 SERPL-SCNC: 27 MMOL/L — SIGNIFICANT CHANGE UP (ref 22–31)
CREAT SERPL-MCNC: 0.86 MG/DL — SIGNIFICANT CHANGE UP (ref 0.5–1.3)
EGFR: 117 ML/MIN/1.73M2 — SIGNIFICANT CHANGE UP
EOSINOPHIL # BLD AUTO: 0.34 K/UL — SIGNIFICANT CHANGE UP (ref 0–0.5)
EOSINOPHIL NFR BLD AUTO: 7.1 % — HIGH (ref 0–6)
GLUCOSE SERPL-MCNC: 89 MG/DL — SIGNIFICANT CHANGE UP (ref 70–99)
HCT VFR BLD CALC: 40.9 % — SIGNIFICANT CHANGE UP (ref 39–50)
HGB BLD-MCNC: 14.1 G/DL — SIGNIFICANT CHANGE UP (ref 13–17)
IMM GRANULOCYTES NFR BLD AUTO: 0.4 % — SIGNIFICANT CHANGE UP (ref 0–1.5)
INR BLD: 1.06 — SIGNIFICANT CHANGE UP (ref 0.88–1.16)
LYMPHOCYTES # BLD AUTO: 1.37 K/UL — SIGNIFICANT CHANGE UP (ref 1–3.3)
LYMPHOCYTES # BLD AUTO: 28.6 % — SIGNIFICANT CHANGE UP (ref 13–44)
MAGNESIUM SERPL-MCNC: 2.2 MG/DL — SIGNIFICANT CHANGE UP (ref 1.6–2.6)
MCHC RBC-ENTMCNC: 32.1 PG — SIGNIFICANT CHANGE UP (ref 27–34)
MCHC RBC-ENTMCNC: 34.5 GM/DL — SIGNIFICANT CHANGE UP (ref 32–36)
MCV RBC AUTO: 93.2 FL — SIGNIFICANT CHANGE UP (ref 80–100)
MONOCYTES # BLD AUTO: 0.44 K/UL — SIGNIFICANT CHANGE UP (ref 0–0.9)
MONOCYTES NFR BLD AUTO: 9.2 % — SIGNIFICANT CHANGE UP (ref 2–14)
NEUTROPHILS # BLD AUTO: 2.59 K/UL — SIGNIFICANT CHANGE UP (ref 1.8–7.4)
NEUTROPHILS NFR BLD AUTO: 54.1 % — SIGNIFICANT CHANGE UP (ref 43–77)
NRBC # BLD: 0 /100 WBCS — SIGNIFICANT CHANGE UP (ref 0–0)
PHOSPHATE SERPL-MCNC: 3.3 MG/DL — SIGNIFICANT CHANGE UP (ref 2.5–4.5)
PLATELET # BLD AUTO: 168 K/UL — SIGNIFICANT CHANGE UP (ref 150–400)
POTASSIUM SERPL-MCNC: 3.9 MMOL/L — SIGNIFICANT CHANGE UP (ref 3.5–5.3)
POTASSIUM SERPL-SCNC: 3.9 MMOL/L — SIGNIFICANT CHANGE UP (ref 3.5–5.3)
PROT SERPL-MCNC: 7.4 G/DL — SIGNIFICANT CHANGE UP (ref 6–8.3)
PROTHROM AB SERPL-ACNC: 12.6 SEC — SIGNIFICANT CHANGE UP (ref 10.5–13.4)
RBC # BLD: 4.39 M/UL — SIGNIFICANT CHANGE UP (ref 4.2–5.8)
RBC # FLD: 11.7 % — SIGNIFICANT CHANGE UP (ref 10.3–14.5)
SODIUM SERPL-SCNC: 138 MMOL/L — SIGNIFICANT CHANGE UP (ref 135–145)
WBC # BLD: 4.79 K/UL — SIGNIFICANT CHANGE UP (ref 3.8–10.5)
WBC # FLD AUTO: 4.79 K/UL — SIGNIFICANT CHANGE UP (ref 3.8–10.5)

## 2022-06-06 PROCEDURE — 86900 BLOOD TYPING SEROLOGIC ABO: CPT

## 2022-06-06 PROCEDURE — 99285 EMERGENCY DEPT VISIT HI MDM: CPT

## 2022-06-06 PROCEDURE — 71045 X-RAY EXAM CHEST 1 VIEW: CPT

## 2022-06-06 PROCEDURE — 85610 PROTHROMBIN TIME: CPT

## 2022-06-06 PROCEDURE — 80307 DRUG TEST PRSMV CHEM ANLYZR: CPT

## 2022-06-06 PROCEDURE — 85730 THROMBOPLASTIN TIME PARTIAL: CPT

## 2022-06-06 PROCEDURE — 83690 ASSAY OF LIPASE: CPT

## 2022-06-06 PROCEDURE — 96374 THER/PROPH/DIAG INJ IV PUSH: CPT | Mod: XU

## 2022-06-06 PROCEDURE — 36415 COLL VENOUS BLD VENIPUNCTURE: CPT

## 2022-06-06 PROCEDURE — 96375 TX/PRO/DX INJ NEW DRUG ADDON: CPT | Mod: XU

## 2022-06-06 PROCEDURE — 99238 HOSP IP/OBS DSCHRG MGMT 30/<: CPT

## 2022-06-06 PROCEDURE — 83735 ASSAY OF MAGNESIUM: CPT

## 2022-06-06 PROCEDURE — 86850 RBC ANTIBODY SCREEN: CPT

## 2022-06-06 PROCEDURE — 84100 ASSAY OF PHOSPHORUS: CPT

## 2022-06-06 PROCEDURE — 81001 URINALYSIS AUTO W/SCOPE: CPT

## 2022-06-06 PROCEDURE — 87635 SARS-COV-2 COVID-19 AMP PRB: CPT

## 2022-06-06 PROCEDURE — 82803 BLOOD GASES ANY COMBINATION: CPT

## 2022-06-06 PROCEDURE — 80053 COMPREHEN METABOLIC PANEL: CPT

## 2022-06-06 PROCEDURE — 85025 COMPLETE CBC W/AUTO DIFF WBC: CPT

## 2022-06-06 PROCEDURE — 86901 BLOOD TYPING SEROLOGIC RH(D): CPT

## 2022-06-06 PROCEDURE — 80076 HEPATIC FUNCTION PANEL: CPT

## 2022-06-06 RX ORDER — PANTOPRAZOLE SODIUM 20 MG/1
2 TABLET, DELAYED RELEASE ORAL
Qty: 60 | Refills: 2
Start: 2022-06-06 | End: 2022-09-03

## 2022-06-06 RX ORDER — THIAMINE MONONITRATE (VIT B1) 100 MG
1 TABLET ORAL
Qty: 30 | Refills: 2
Start: 2022-06-06 | End: 2022-09-03

## 2022-06-06 RX ORDER — FOLIC ACID 0.8 MG
1 TABLET ORAL
Qty: 30 | Refills: 2
Start: 2022-06-06 | End: 2022-09-03

## 2022-06-06 RX ADMIN — PANTOPRAZOLE SODIUM 40 MILLIGRAM(S): 20 TABLET, DELAYED RELEASE ORAL at 05:51

## 2022-06-06 RX ADMIN — Medication 100 MILLIGRAM(S): at 10:56

## 2022-06-06 RX ADMIN — Medication 1 TABLET(S): at 11:12

## 2022-06-06 RX ADMIN — Medication 1 MILLIGRAM(S): at 10:57

## 2022-06-06 NOTE — DISCHARGE NOTE NURSING/CASE MANAGEMENT/SOCIAL WORK - NSDCPEFALRISK_GEN_ALL_CORE
For information on Fall & Injury Prevention, visit: [email or web address] OR  [email or web address] OR  https://www.cdc.gov/steadi/patient.html

## 2022-06-06 NOTE — DISCHARGE NOTE PROVIDER - ATTENDING DISCHARGE PHYSICAL EXAMINATION:
patient was seen and examined today. PT is feeling well, denied any complaints. Denied hemoptysis. Pt is ready for discharge today.

## 2022-06-06 NOTE — DISCHARGE NOTE NURSING/CASE MANAGEMENT/SOCIAL WORK - PATIENT PORTAL LINK FT
You can access the FollowMyHealth Patient Portal offered by [facility] by registering at the following website: [email or web address]. By joining [employer]’s FollowMyHealth portal, you will also be able to view your health information using other applications (apps) compatible with our system.

## 2022-06-06 NOTE — DISCHARGE NOTE PROVIDER - NSDCCPCAREPLAN_GEN_ALL_CORE_FT
PRINCIPAL DISCHARGE DIAGNOSIS  Diagnosis: Hematemesis  Assessment and Plan of Treatment: You were admitted to the hospital with hematemesis, which means you were vomiting blood. This may have been caused by a small tear in your esophagus. You did not have any more episodes of bloody emesis and your blood levels remained stable throughout your admission. Please continue to take the medications we have prescribed. Please follow up with gastroenterologist, Dr. Levi within 1 to 2 weeks of discharge.

## 2022-06-06 NOTE — DISCHARGE NOTE PROVIDER - CARE PROVIDER_API CALL
Andrew Levi (MD)  Gastroenterology; Internal Medicine  [address] [place] [address]  Phone: [phone]  Fax: [phone]  Follow Up Time:

## 2022-06-06 NOTE — PROGRESS NOTE ADULT - ASSESSMENT
31 year old male PMHx EtOH abuse presenting with 1 day history of hematemesis/coffee ground emesis, GI consulted for consideration of endoscopic evaluation.     #Hematemesis  #Coffee Ground Emesis   Patient presenting with complaints of 1-2 days of nausea/vomiting, initially yellow in color, turned black with blood red streaks, in the setting of heavy etOH consumption, last drink 4-5 am on 6/4. H/H stable with slight drop in Hgb however dry on admission, suspect avel hernández tear. Previous 2016 CT A/P demonstrating hepatic steatosis, no apparent compromise to synthetic function or e/o to suggest portal HTN per lab review (normal albumin, normal PLTs and coags). Variceal bleeding low on ddx  -Advance diet as tolerated  -PPI 40 mg IVP BID   -Closely monitor H/H  -Maintain Active T&S  -Transfusion goal as per primary team  -Anti-emetics as per primary team  -H/H stable on AM labs, 14.1, will need outpatient GI follow up to monitor LTs, Hgb and for scheduling of outpatient EGD. Discussed with patient, amenable to outpatient follow up.     #EtoH abuse   #Transaminitis  Downtrending LTs on 6/6/22 BW. With 6 yr history of EtOH abuse, with more heavy drinking within the last year, reporting 60-80 drinks of beer +/- whiskey per week  - Obtain abdominal US to further evaluate liver morphology in the setting of heavy EtOH consumption  - Daily CMPs   - Counseled on importance of cessation, SW consult for EtOH abuse  - CIWA (high risk protocol)  - MVI, thiamine, FA daily     Case discussed with c attending and primary team.     Jocelyne Angeles DO  Gastroenterology Fellow  Pager: [phone]
Patient is a 31 year old male with no significant past medical history presenting with 1 day history of hematemesis admitted for likely avel hernández tear, known to GI. No prior hx of alcohol withdrawal or intubation.

## 2022-06-06 NOTE — PROGRESS NOTE ADULT - SUBJECTIVE AND OBJECTIVE BOX
GASTROENTEROLOGY PROGRESS NOTE  Patient seen and examined at bedside. AM Hgb stable @ 14.1 on AM labs.    ROS: Patient with no complaints at this time, no abdominal pain, nausea/vomiting (last episode of vomiting day of admission), tolerating clears. Notes BMs to be dark brown.     PERTINENT REVIEW OF SYSTEMS:  CONSTITUTIONAL: No weakness, fevers or chills  HEENT: No visual changes; No vertigo or throat pain   GASTROINTESTINAL: As above.  NEUROLOGICAL: No numbness or weakness  SKIN: No itching, burning, rashes, or lesions     Allergies    No Known Allergies    Intolerances      MEDICATIONS:  MEDICATIONS  (STANDING):  folic acid 1 milliGRAM(s) Oral daily  multivitamin 1 Tablet(s) Oral daily  pantoprazole  Injectable 40 milliGRAM(s) IV Push every 12 hours  thiamine 100 milliGRAM(s) Oral daily    MEDICATIONS  (PRN):  LORazepam   Injectable 2 milliGRAM(s) IV Push every 1 hour PRN CIWA-Ar score 8 or greater  ondansetron Injectable 4 milliGRAM(s) IV Push every 8 hours PRN Nausea and/or Vomiting    Vital Signs Last 24 Hrs  T(C): 36.4 (2022 05:47), Max: 37.1 (2022 21:00)  T(F): 97.6 (2022 05:47), Max: 98.8 (2022 21:00)  HR: 54 (2022 05:47) (54 - 75)  BP: 111/71 (2022 05:47) (111/71 - 132/83)  BP(mean): --  RR: 18 (2022 05:47) (16 - 18)  SpO2: 96% (2022 05:47) (96% - 97%)    PHYSICAL EXAM:    General: Well developed; well nourished; in no acute distress  HEENT: MMM, conjunctiva and sclera clear; +tongue fasciculations  Gastrointestinal: Soft, non-tender non-distended; Normal bowel sounds; No rebound or guarding  Extremities: Normal range of motion, No clubbing, cyanosis or edema  Neurological: Alert and oriented x3; faint tremor of outstretched hands  Skin: Warm and dry. No obvious rash    LABS:                        14.1   4.79  )-----------( 168      ( 2022 07:34 )             40.9     06-06    138  |  100  |  7   ----------------------------<  89  3.9   |  27  |  0.86    Ca    9.3      2022 07:34  Phos  3.3     06-06  Mg     2.2     06-06    TPro  7.4  /  Alb  4.4  /  TBili  1.1  /  DBili  x   /  AST  89<H>  /  ALT  113<H>  /  AlkPhos  72  06-06    PT/INR - ( 2022 07:34 )   PT: 12.6 sec;   INR: 1.06          PTT - ( 2022 07:34 )  PTT:29.8 sec      Urinalysis Basic - ( 2022 22:07 )    Color: Yellow / Appearance: Clear / S.010 / pH: x  Gluc: x / Ketone: 15 mg/dL  / Bili: Small / Urobili: 4.0 E.U./dL   Blood: x / Protein: 30 mg/dL / Nitrite: NEGATIVE   Leuk Esterase: NEGATIVE / RBC: < 5 /HPF / WBC < 5 /HPF   Sq Epi: x / Non Sq Epi: 0-5 /HPF / Bacteria: Present /HPF                RADIOLOGY & ADDITIONAL STUDIES:  Reviewed
SUBJECTIVE/OVERNIGHT EVENTS: No acute overnight events. Pt seen in AM at bedside, resting comfortably in bed, and does not appear to be in any acute distress. Mild epigastric pain, subjectively improved.    VITAL SIGNS:  Vital Signs Last 24 Hrs  T(C): 36.5 (05 Jun 2022 04:50), Max: 36.8 (04 Jun 2022 16:21)  T(F): 97.7 (05 Jun 2022 04:50), Max: 98.2 (04 Jun 2022 16:21)  HR: 61 (05 Jun 2022 04:50) (61 - 89)  BP: 119/68 (05 Jun 2022 04:50) (119/68 - 152/72)  BP(mean): --  RR: 18 (05 Jun 2022 04:50) (15 - 18)  SpO2: 98% (05 Jun 2022 04:50) (95% - 98%)    PHYSICAL EXAM:  General: NAD; speaking in full sentences  HEENT: NC/AT; PERRL; EOMI; MMM  Neck: supple; no JVD  Cardiac: RRR; +S1/S2  Pulm: CTA B/L; no W/R/R  GI: soft, NT/ND, +BS  Extremities: WWP; no edema, clubbing or cyanosis  Vasc: 2+ radial, DP pulses B/L  Neuro: AAOx3; no focal deficits    MEDICATIONS:  MEDICATIONS  (STANDING):  folic acid 1 milliGRAM(s) Oral daily  multivitamin 1 Tablet(s) Oral daily  pantoprazole  Injectable 40 milliGRAM(s) IV Push every 12 hours  thiamine 100 milliGRAM(s) Oral daily    MEDICATIONS  (PRN):  LORazepam   Injectable 2 milliGRAM(s) IV Push every 1 hour PRN CIWA-Ar score 8 or greater  ondansetron Injectable 4 milliGRAM(s) IV Push every 8 hours PRN Nausea and/or Vomiting      ALLERGIES:  Allergies    No Known Allergies    Intolerances        LABS:                        13.3   4.52  )-----------( 151      ( 05 Jun 2022 05:30 )             39.7     06-05    140  |  102  |  12  ----------------------------<  70  4.0   |  23  |  0.85    Ca    8.7      05 Jun 2022 05:30  Phos  3.5     06-05  Mg     2.3     06-05    TPro  7.0  /  Alb  4.2  /  TBili  1.4<H>  /  DBili  x   /  AST  122<H>  /  ALT  134<H>  /  AlkPhos  69  06-05    PT/INR - ( 04 Jun 2022 17:02 )   PT: 12.9 sec;   INR: 1.08          PTT - ( 05 Jun 2022 05:30 )  PTT:30.6 sec    RADIOLOGY & ADDITIONAL TESTS: Reviewed.

## 2022-06-06 NOTE — DISCHARGE NOTE PROVIDER - NSDCFUADDAPPT_GEN_ALL_CORE_FT
Please follow up with gastroenterologist, Dr. Levi, for further care within 1 to 2 weeks of discharge from the hospital.

## 2022-06-06 NOTE — DISCHARGE NOTE PROVIDER - HOSPITAL COURSE
Patient is a 31 year old male with no significant past medical history presenting with 1 day history of hematemesis admitted for concern for Fatmata Ruffin tear     #Hematemesis  Patient presents w/ 1 day hx of hematemesis after binge drinking. Reports 6 episodes of what patient describes as coffee ground emesis. Patient placed on Protonix drip and made NPO for EGD. Patient remained stable with no subsequent bouts of emesis during hospitalization. Hb remained stable.  - c/w multivitamin, thiamine 100 mg PO daily, Protonix 40 mg PO daily, folic acid 1 mg PO daily  - f/u with gastroenterologist, Dr. Levi    #Alcohol abuse.   Patient reports drinking 5 times/week, either 4 beers or 2-3 shots whiskey. Patient has no Hx of alcohol withdrawal or seizures. Previous IV drug use, last use 1 month ago. Patient positive for cocaine in urine toxicology. Per patient, recently saw PCP and said his blood work "looked good." Patient placed on CIWA protocol during hospitalization, but did not require any Ativan. liver function tests downtrending.  - outpatient RUQ US in setting of chronic alcohol use  - counseled on importance of alcohol abuse and drug abuse cessation  - f/u with PCP and gastroenterologist    New medications/therapies: multivitamin, thiamine 100 mg PO daily, Protonix 40 mg PO daily, folic acid 1 mg PO daily  Exam to be followed outpatient: GI  Discharge plan: discharge to home

## 2022-06-06 NOTE — DISCHARGE NOTE PROVIDER - NSDCMRMEDTOKEN_GEN_ALL_CORE_FT
folic acid 1 mg oral tablet: 1 tab(s) orally once a day  Multiple Vitamins oral tablet: 1 tab(s) orally once a day  pantoprazole 20 mg oral delayed release tablet: 2 tab(s) orally once a day   thiamine 100 mg oral tablet: 1 tab(s) orally once a day

## 2022-06-10 DIAGNOSIS — F10.20 ALCOHOL DEPENDENCE, UNCOMPLICATED: ICD-10-CM

## 2022-06-10 DIAGNOSIS — K92.2 GASTROINTESTINAL HEMORRHAGE, UNSPECIFIED: ICD-10-CM

## 2022-06-10 DIAGNOSIS — Z80.8 FAMILY HISTORY OF MALIGNANT NEOPLASM OF OTHER ORGANS OR SYSTEMS: ICD-10-CM

## 2022-06-10 DIAGNOSIS — K22.6 GASTRO-ESOPHAGEAL LACERATION-HEMORRHAGE SYNDROME: ICD-10-CM

## 2022-06-10 DIAGNOSIS — Z80.2 FAMILY HISTORY OF MALIGNANT NEOPLASM OF OTHER RESPIRATORY AND INTRATHORACIC ORGANS: ICD-10-CM
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
## 2022-06-10 DIAGNOSIS — K76.0 FATTY (CHANGE OF) LIVER, NOT ELSEWHERE CLASSIFIED: ICD-10-CM

## 2022-06-10 DIAGNOSIS — Z80.0 FAMILY HISTORY OF MALIGNANT NEOPLASM OF DIGESTIVE ORGANS: ICD-10-CM

## 2024-09-30 NOTE — ED ADULT NURSE NOTE - SUICIDE SCREENING QUESTION 2
[Initial Consultation] : an initial consultation for [Subsequent Evaluation] : a subsequent evaluation for [FreeTextEntry2] : right ear discomfort No